# Patient Record
Sex: FEMALE | Race: WHITE
[De-identification: names, ages, dates, MRNs, and addresses within clinical notes are randomized per-mention and may not be internally consistent; named-entity substitution may affect disease eponyms.]

---

## 2021-04-13 ENCOUNTER — HOSPITAL ENCOUNTER (EMERGENCY)
Dept: HOSPITAL 46 - ED | Age: 66
Discharge: ANOTHER HOSPITAL | End: 2021-04-13
Payer: MEDICARE

## 2021-04-13 VITALS — WEIGHT: 120 LBS | BODY MASS INDEX: 18.19 KG/M2 | HEIGHT: 68 IN

## 2021-04-13 DIAGNOSIS — Z88.1: ICD-10-CM

## 2021-04-13 DIAGNOSIS — Z79.899: ICD-10-CM

## 2021-04-13 DIAGNOSIS — Z87.891: ICD-10-CM

## 2021-04-13 DIAGNOSIS — K21.9: ICD-10-CM

## 2021-04-13 DIAGNOSIS — E78.00: ICD-10-CM

## 2021-04-13 DIAGNOSIS — I21.4: Primary | ICD-10-CM

## 2021-04-13 DIAGNOSIS — Z20.822: ICD-10-CM

## 2021-04-13 PROCEDURE — C9113 INJ PANTOPRAZOLE SODIUM, VIA: HCPCS

## 2021-04-13 PROCEDURE — U0003 INFECTIOUS AGENT DETECTION BY NUCLEIC ACID (DNA OR RNA); SEVERE ACUTE RESPIRATORY SYNDROME CORONAVIRUS 2 (SARS-COV-2) (CORONAVIRUS DISEASE [COVID-19]), AMPLIFIED PROBE TECHNIQUE, MAKING USE OF HIGH THROUGHPUT TECHNOLOGIES AS DESCRIBED BY CMS-2020-01-R: HCPCS

## 2021-04-13 PROCEDURE — C9803 HOPD COVID-19 SPEC COLLECT: HCPCS

## 2021-04-14 NOTE — EKG
[facility]
                                    [address] [place] [address]
                                  [place], [place]  [address]
_________________________________________________________________________________________
                                                                 Signed   
 
 
Normal sinus rhythm
Minimal voltage criteria for LVH, may be normal variant
Borderline ECG
When compared with ECG of 13-APR-2021 19:48, (Unconfirmed)
No significant change was found
Confirmed by CAMPBELL HAN DO ([id number]) on 4/14/2021 1:56:48 PM
 
 
 
 
 
 
 
 
 
 
 
 
 
 
 
 
 
 
 
 
 
 
 
 
 
 
 
 
 
 
 
 
 
 
 
 
 
 
 
    Electronically Signed By: CAMPBELL HAN DO  04/14/21 1357
_________________________________________________________________________________________
PATIENT NAME:     MARY GARNICA                    
MEDICAL RECORD #: [id number]                     Electrocardiogram             
          ACCT #: [id number]  
DATE OF BIRTH:   [birth year]                                       
PHYSICIAN:   CAMPBELL HAN DO                     REPORT #: [id number]
REPORT IS CONFIDENTIAL AND NOT TO BE RELEASED WITHOUT AUTHORIZATION

## 2021-04-14 NOTE — EKG
[facility]
                                    [address]
                                  [place], [place]  [address]
_________________________________________________________________________________________
                                                                 Signed   
 
 
Sinus bradycardia
Possible Left atrial enlargement
Left ventricular hypertrophy
Abnormal ECG
No previous ECGs available
Confirmed by CAMPBELL HAN DO ([id number]) on 4/14/2021 1:56:01 PM
 
 
 
 
 
 
 
 
 
 
 
 
 
 
 
 
 
 
 
 
 
 
 
 
 
 
 
 
 
 
 
 
 
 
 
 
 
 
 
    Electronically Signed By: CAMPBELL HAN DO  04/14/21 1356
_________________________________________________________________________________________
PATIENT NAME:     MARY GARNICA                    
MEDICAL RECORD #: [id number]                     Electrocardiogram             
          ACCT #: [id number]  
DATE OF BIRTH:   [birth year]                                       
PHYSICIAN:   CAMPBELL HAN DO                     REPORT #: [id number]
REPORT IS CONFIDENTIAL AND NOT TO BE RELEASED WITHOUT AUTHORIZATION

## 2022-03-10 ENCOUNTER — HOSPITAL ENCOUNTER (EMERGENCY)
Dept: HOSPITAL 46 - ED | Age: 67
Discharge: HOME | End: 2022-03-10
Payer: MEDICARE

## 2022-03-10 VITALS — BODY MASS INDEX: 18.19 KG/M2 | HEIGHT: 68 IN | WEIGHT: 120 LBS

## 2022-03-10 DIAGNOSIS — R42: Primary | ICD-10-CM

## 2022-03-10 DIAGNOSIS — Z79.899: ICD-10-CM

## 2022-03-10 DIAGNOSIS — E78.00: ICD-10-CM

## 2022-03-10 DIAGNOSIS — K21.9: ICD-10-CM

## 2022-03-10 DIAGNOSIS — Z87.891: ICD-10-CM

## 2022-03-10 DIAGNOSIS — H61.22: ICD-10-CM

## 2022-03-10 DIAGNOSIS — Z88.1: ICD-10-CM

## 2022-03-10 DIAGNOSIS — Z79.82: ICD-10-CM

## 2022-03-12 NOTE — EKG
[facility]
                                    [address]
                                  [place]  [address]
_________________________________________________________________________________________
                                                                 Signed   
 
 
Sinus bradycardia
Minimal voltage criteria for LVH, may be normal variant ( [place] product )
Borderline ECG
When compared with ECG of 13-APR-2021 21:03,
No significant change was found
Confirmed by SAUL SANTIZO MD ([id number]) on 3/12/2022 3:56:41 PM
 
 
 
 
 
 
 
 
 
 
 
 
 
 
 
 
 
 
 
 
 
 
 
 
 
 
 
 
 
 
 
 
 
 
 
 
 
 
 
    Electronically Signed By: SAUL SANTIZO MD  03/12/22 1556
_________________________________________________________________________________________
PATIENT NAME:     MARY GARNICA                    
MEDICAL RECORD #: [id number]                     Electrocardiogram             
          ACCT #: [id number]  
DATE OF BIRTH:   [birth year]                                       
PHYSICIAN:   SAUL SANTIZO MD           REPORT #: [id number]
REPORT IS CONFIDENTIAL AND NOT TO BE RELEASED WITHOUT AUTHORIZATION

## 2022-08-09 ENCOUNTER — HOSPITAL ENCOUNTER (EMERGENCY)
Dept: HOSPITAL 46 - ED | Age: 67
Discharge: ANOTHER HOSPITAL | End: 2022-08-09
Payer: MEDICARE

## 2022-08-09 VITALS — WEIGHT: 120 LBS | BODY MASS INDEX: 18.19 KG/M2 | HEIGHT: 68 IN

## 2022-08-09 DIAGNOSIS — Z88.1: ICD-10-CM
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
## 2022-08-09 DIAGNOSIS — Z79.899: ICD-10-CM

## 2022-08-09 DIAGNOSIS — S50.311A: ICD-10-CM

## 2022-08-09 DIAGNOSIS — S72.001A: Primary | ICD-10-CM

## 2022-08-09 DIAGNOSIS — Z87.891: ICD-10-CM

## 2022-08-09 DIAGNOSIS — Z79.82: ICD-10-CM

## 2022-08-09 DIAGNOSIS — K21.9: ICD-10-CM

## 2022-08-09 PROCEDURE — C9803 HOPD COVID-19 SPEC COLLECT: HCPCS

## 2022-08-09 PROCEDURE — U0003 INFECTIOUS AGENT DETECTION BY NUCLEIC ACID (DNA OR RNA); SEVERE ACUTE RESPIRATORY SYNDROME CORONAVIRUS 2 (SARS-COV-2) (CORONAVIRUS DISEASE [COVID-19]), AMPLIFIED PROBE TECHNIQUE, MAKING USE OF HIGH THROUGHPUT TECHNOLOGIES AS DESCRIBED BY CMS-2020-01-R: HCPCS

## 2024-01-30 NOTE — OR
[facility]
                                    [address] [place], [place]  [address]
_________________________________________________________________________________________
                                                                 Draft    
 
 
DATE OF OPERATION:
01/30/2024
 
SURGEON:
Anthony Gonsalez MD
 
PREOPERATIVE DIAGNOSIS:
Right facial extruding hardware.
 
POSTOPERATIVE DIAGNOSIS:
Right facial extruding hardware.
 
PROCEDURE:
Removal of right facial hardware.
 
ANESTHESIA:
General LMA, CRNA, Soco.
 
PREOPERATIVE HISTORY:
Stephanie is a 68-year-old lady who had a facial fracture 20 years ago.  This was plated,
appears to be a tripod fracture with three plates per x-ray, one on the superior lateral
right orbital rim, one on the right inferior rim and one posteriorly probably in the
pterygoids.  In any event, one of the screws from the infraorbital plate has been
extruding, poking out nearly through skin in the medial portion of the plate.  This has
been uncomfortable for her and she is taken to the operating room for removal of this
extruding foreign body. 
 
OPERATIVE PROCEDURE AND FINDINGS:
After informed consent, the patient was taken to the operating room, placed in supine
position where general LMA anesthesia was induced.  The patient and procedure were
verified.  Right face was sterilely prepped and draped.  Preop x-rays reviewed
throughout.  The screw in question was identified.  1% lidocaine with epinephrine
injected around this.  A small transverse incision made over the screw and sharp and
blunt dissection revealed the head.  The screwdriver was used to remove the screw
hemostats.  Finished removal.  The medial portion of the plate had bent outward and was
protruding portion of this about half a cm was removed and normal conformation of the
plate was then obtained.  No further protruding material.  The bleeding was minimal.
The wound was closed with Steri-Strips.  The patient tolerated the procedure well, was
awakened, extubated, transported to recovery room in good condition. 
 
COMPLICATIONS:
None.
 
                                                                                    
_________________________________________________________________________________________
PATIENT NAME:     STEPHANIE GARNICA                    
MEDICAL RECORD #: [id number]            OPERATIVE REPORT              
          ACCT #: [id number]  
DATE OF BIRTH:   [birth year]            REPORT #: [id number]      
PHYSICIAN:        ANTHONY GONSALEZ MD              
PCP:              PETRA VALDES MD           
REPORT IS CONFIDENTIAL AND NOT TO BE RELEASED WITHOUT AUTHORIZATION
 
 
                                  [facility]
                                    [address] [place] [address]  [address]
_________________________________________________________________________________________
                                                                 Draft    
 
 
 
ESTIMATED BLOOD LOSS:
Minimal.
 
SPECIMEN:
Plate and screw given to the patient.
 
DRAINS:
None.
 
 
 
            ________________________________________
            Anthony Gonsalez MD GC/SEN
Job #:  [id number]
DD:  01/30/2024 09:35:48
DT:  01/30/2024 10:00:59
 
 
Copies:                                
~
 
 
 
 
 
 
 
 
 
 
 
 
 
 
 
 
 
 
 
                                                                                    
_________________________________________________________________________________________
PATIENT NAME:     STEPHANIE GARNICA                    
MEDICAL RECORD #: [id number]            OPERATIVE REPORT              
          ACCT #: [id number]  
DATE OF BIRTH:   [birth year]            REPORT #: [id number]      
PHYSICIAN:        ANTHONY GONSALEZ MD              
PCP:              PETRA VALDES MD           
REPORT IS CONFIDENTIAL AND NOT TO BE RELEASED WITHOUT AUTHORIZATION

## 2024-01-30 NOTE — EKG
[facility]
                                    [address] [facility]
                                  [place]  [address]
_________________________________________________________________________________________
                                                                 Signed   
 
 
Sinus bradycardia
Otherwise normal ECG
When compared with ECG of 09-AUG-2022 18:41,
premature ventricular complexes are no longer present
Confirmed by Renaldo Morrison MD ([id number]) on 1/30/2024 8:03:56 PM
 
 
 
 
 
 
 
 
 
 
 
 
 
 
 
 
 
 
 
 
 
 
 
 
 
 
 
 
 
 
 
 
 
 
 
 
 
 
 
 
    Electronically Signed By: RENALDO MORRISON MD  01/30/24 2004
_________________________________________________________________________________________
PATIENT NAME:     MARY GARNICA                    
MEDICAL RECORD #: [id number]                     Electrocardiogram             
          ACCT #: [id number]  
DATE OF BIRTH:   [birth year]                                       
PHYSICIAN:   RENALDO MORRISON MD                 REPORT #: [id number]
REPORT IS CONFIDENTIAL AND NOT TO BE RELEASED WITHOUT AUTHORIZATION

## 2024-01-30 NOTE — NUR
01/30/24 0941 Diggs,Kym ROGER
0931: PATIENT ARRIVED TO PACU ASLEEP. FOGGING MASK.
0939: PATIENT AWAKE AND TALKING. O2 MASK REMOVED.

## 2025-02-03 NOTE — NUR
02/03/25 0833 Alem Martin
OXYGEN SATURATION REMAINS 100% ON 3L VIA NC. OXYGEN IS REDUCED TO 2L
VIA NC.

## 2025-02-04 NOTE — OR
[facility]
                                    [address], [place]  [address]
_________________________________________________________________________________________
                                                                 Signed   
 
 
DATE OF OPERATION:
02/03/2025
 
SURGEON:
Dipak Gutierrez MD
 
PREOPERATIVE DIAGNOSES:
1. Recurrent episodic dysphagia, known hiatal hernia.
2. Multiple medical problems including chronic obstructive pulmonary disease, history of
myocardial infarction (non-stentable). 
 
POSTOPERATIVE DIAGNOSES:
No evidence of stricture; shallow fissure of distal esophagus.  Normal stomach and
duodenum.  Poor flap valve. 
 
PROCEDURE:
Esophagogastroduodenoscopy with biopsy.
 
ANESTHESIA:
Intravenous sedation, fentanyl 100 mcg and Versed 1 mg.
 
INDICATIONS FOR THE PROCEDURE:
This 69-year-old white woman is well known to me from the past having undergone upper
endoscopy in 2017.  She is known to have a small hiatal hernia and has taken PPI
medications for this with good result overall.  The patient is a longstanding smoker,
having recently quit (again) in November.  She has had progressive COPD and indeed
suffered myocardial infarction, which was not amenable to stenting in the past.  She
does have episodic dysphagia.  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
well.  She is admitted to undergo upper endoscopy and possible dilation depending on
findings and understands the risk of bleeding, infection, and perforation. 
 
FINDINGS:
There was no evidence of stricture or neoplasm.  There was superficial fissuring of the
mucosa of the esophagus, which was otherwise normal in appearance.  There was no
evidence of felinization of the esophagus, though some suspicion remains for possible
eosinophilic esophagitis.  The flap valve was poor, though there was no large hiatal
hernia proper.  Stomach and duodenum were normal.  CLOtest was negative 15 minutes post
procedure. 
 
PROCEDURE IN DETAIL:
The patient was brought to the endoscopy suite and placed in lateral decubitus position
after undergoing lidocaine hypopharyngeal anesthesia.  A bite block was placed after
 
    Electronically Signed By: DIPAK GUTIERREZ MD  02/04/25 0913
_________________________________________________________________________________________
PATIENT NAME:     MARY GARNICA                    
MEDICAL RECORD #: [id number]            OPERATIVE REPORT              
          ACCT #: [id number]  
DATE OF BIRTH:   [birth year]            REPORT #: [id number]      
PHYSICIAN:        DIPAK GUTIERREZ MD                 
PCP:              PETRA VALDES MD           
REPORT IS CONFIDENTIAL AND NOT TO BE RELEASED WITHOUT AUTHORIZATION
 
 
                                  [facility]
                                    [address], [place]  [address]
_________________________________________________________________________________________
                                                                 Signed   
 
 
satisfactory intravenous sedation with full cardiopulmonary monitoring.  An Olympus
video upper endoscope was passed into the hypopharynx.  The vocal cords were normal.
Scope was easily passed into the esophagus throughout its length, it appeared
essentially normal, although there was some minor superficial fissuring of the distal
portion.  Scope was passed to the stomach, which was insufflated with air.  There was no
sign of bile within the stomach.  Rugal folds were normal.  Antrum was reasonably normal
as was the pylorus.  The scope was passed into the duodenum, which was normal.  Biopsies
taken of the duodenum to assess for celiac disease.  The scope was withdrawn and
biopsies then taken of the antrum for both RAF and pathologic testing.  Retroflexed view
showed a somewhat effaced flap valve, but no large hiatal hernia proper.  The scope was
withdrawn to the distal esophagus and biopsies were taken of that area.  The scope
withdrawn to the mid esophagus and additional biopsies obtained.  There was no sign of
concentric folds of the esophagus (felinization), though I still suspect the possibility
of the eosinophilic esophagitis.  Given that there was no distinct stricture, I did not
feel the hazard of dilation was appropriate on this occasion.  The scope was removed.
The patient was taken to the recovery room in good condition. 
 
CONCLUDING DIAGNOSIS:
No obvious cause of dysphagia.  We will await pathology report to assess for the
eosinophilic esophagitis.  She may have a primary dysmotility syndrome as well.  We will
order a video esophagram to grossly estimate that possibility and see her in the office
after video esophagram is complete.  She should continue with her PPI medication
(omeprazole) in the meantime. 
 
 
 
            ________________________________________
            MD GM Springer/JENNIL
Job #:  [id number]
DD:  02/03/2025 08:34:11
DT:  02/03/2025 08:55:11
 
cc:            Petra Valdes MD
 
 
Copies:  PETRA VALDES DMD
~
 
 
 
    Electronically Signed By: DIPAK GUTIERREZ MD  02/04/25 0913
_________________________________________________________________________________________
PATIENT NAME:     MARY GARNICA                    
MEDICAL RECORD #: [id number]            OPERATIVE REPORT              
          ACCT #: [id number]  
DATE OF BIRTH:   [birth year]            REPORT #: [id number]      
PHYSICIAN:        DIPAK GUTIERREZ MD                 
PCP:              PETRA VALDES MD           
REPORT IS CONFIDENTIAL AND NOT TO BE RELEASED WITHOUT AUTHORIZATION

## 2025-02-06 NOTE — PATH
[facility]
                                    [address] [place], [place]  [address]
_________________________________________________________________________________________
                                                                 Signed   
 
 
 
SPECIMEN(S): A DUODENUM BIOPSY
SPECIMEN(S): B ANTRUM BIOPSY
SPECIMEN(S): C LOWER ESOPHAGUS BIOPSY
SPECIMEN(S): D MIDDLE ESOPHAGEAL BIOPSY
 
SPECIMEN SOURCE:
A. DUODENUM BIOPSY
B. ANTRUM BIOPSY
C. LOWER ESOPHAGUS BIOPSY
D. MIDDLE ESOPHAGEAL BIOPSY
 
CLINICAL HISTORY:
GERD, dysphagia.  Post: Poss eosinophilic esophagitis, no stricture, hiatal 
hernia. 
 
FINAL PATHOLOGIC DIAGNOSIS:
A. Duodenum, biopsy:
-  Duodenal mucosa with no significant pathologic changes
B. Stomach, antrum, biopsy:
-  Gastric antral mucosa with reactive gastropathy
-  Negative for Helicobacter pylori with HE stains
C. Esophagus, distal, biopsy:
-  Esophageal squamous mucosa with no significant pathologic changes
D. Esophagus, mid, biopsy:
-  Esophageal squamous mucosa with no significant pathologic changes
BRP
 
MICROSCOPIC EXAMINATION:
Histologic sections of all submitted blocks are examined by light microscopy.  
These findings, together with the gross examination, support the pathologic 
diagnosis. 
 
GROSS DESCRIPTION:
A. The specimen, labeled and designated "HYUN Garnica, duodenum biopsy," is 
received in formalin and consists of one tan soft tissue fragment, 0.5 cm. 
Entirely submitted in (A1). 
B. The specimen, labeled and designated "HYUN Garnica, antrum biopsy," is received 
in formalin and consists of one tan soft tissue fragment, 0.4 cm. Entirely 
submitted in (B1). 
C. The specimen, labeled and designated "HYUN Garnica, lower esophagus biopsy," is 
received in formalin and consists of two tan soft tissue fragments, ranging 
 
                                                                                    
_________________________________________________________________________________________
PATIENT NAME:     MARY GARNICA                    
MEDICAL RECORD #: [id number]            PATHOLOGY                     
          ACCT #: [id number]       ACCESSION #: [id number]     
DATE OF BIRTH:   [birth year]            REPORT #: [id number]       
PHYSICIAN:        [facility]              
PCP:              PETRA VALDES MD           
REPORT IS CONFIDENTIAL AND NOT TO BE RELEASED WITHOUT AUTHORIZATION
 
 
                                  [facility]
                                    [address] [place], [place]  [address]
_________________________________________________________________________________________
                                                                 Signed   
 
 
from 0.2-0.4 cm. Entirely submitted in (C1). 
D. The specimen, labeled and designated "HYUN Garnica, middle esophageal biopsy," 
is received in formalin and consists of three tan soft tissue fragments, 
ranging from 0.4-0.6 cm. Entirely submitted in 
(D1).
AB  (under the direct supervision of a pathologist)
 
The Gross Description was prepared using a voice recognition system. The report 
was reviewed for accuracy; however, sound-alike word errors, addition and/or 
deletions may occur. If there is any 
question about this report, please contact Client Services.
 
ADDITIONAL NOTES:
Immunohistochemical and/or in situ hybridization studies if performed in this 
case included appropriate positive controls that reacted as expected.  This 
test was developed and its performance 
characteristics determined by [facility].  It has not been cleared or 
approved by the U.S. Food and Drug Administration.  The FDA has determined that 
such clearance or approval is not 
necessary.  This test is used for clinical purposes.  It should not be regarded 
as investigational or for research.  [facility] is certified under the 
Clinical Laboratory Improvement 
Amendments of 1988 (CLIA) as qualified to perform high complexity clinical 
laboratory testing. 
Technical component was performed by [email or web address], [address] [place] [id number] (CLIA# [id number]). Professional interpretation was 
performed by [facility], [address] [place] [id number] (CLIA#: [id number]).
 
Diagnostician:  Adán Aggarwal MD
Pathologist
Electronically Signed 02/06/2025
 
 
Copies:                                
~
 
 
 
 
 
 
 
                                                                                    
_________________________________________________________________________________________
PATIENT NAME:     MARY GARNICA                    
MEDICAL RECORD #: [id number]            PATHOLOGY                     
          ACCT #: [id number]       ACCESSION #: [id number]     
DATE OF BIRTH:   [birth year]            REPORT #: [id number]       
PHYSICIAN:        [facility]              
PCP:              PETRA VALDES MD           
REPORT IS CONFIDENTIAL AND NOT TO BE RELEASED WITHOUT AUTHORIZATION